# Patient Record
Sex: MALE | Race: WHITE | NOT HISPANIC OR LATINO | URBAN - NONMETROPOLITAN AREA
[De-identification: names, ages, dates, MRNs, and addresses within clinical notes are randomized per-mention and may not be internally consistent; named-entity substitution may affect disease eponyms.]

---
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
## 2025-01-06 ENCOUNTER — HOSPITAL ENCOUNTER (OUTPATIENT)
Dept: HOSPITAL 73 - LAB | Age: 20
End: 2025-01-06
Payer: COMMERCIAL

## 2025-01-06 DIAGNOSIS — Z91.89: Primary | ICD-10-CM

## 2025-01-06 LAB
ALBUMIN SERPL-MCNC: 4.7 G/DL (ref 3.5–5)
ALBUMIN/GLOB SERPL: 1.9 {RATIO} (ref 1–2.8)
ALP SERPL-CCNC: 67 U/L (ref 38–126)
ALT SERPL-CCNC: 53 IU/L (ref ?–50)
BUN SERPL-MCNC: 15 MG/DL (ref 9–20)
C TRACH DNA UR QL NAA+PROBE: NOT DETECTED
CALCIUM SERPL-MCNC: 9.7 MG/DL (ref 8.4–10.2)
CHLORIDE SERPL-SCNC: 102 MMOL/L (ref 98–107)
CHOLEST SERPL-MCNC: 183 MG/DL (ref 140–199)
CO2 SERPL-SCNC: 28 MMOL/L (ref 22–32)
ESTIMATED GLOMERULAR FILT RATE: > 60 ML/MIN (ref 60–?)
GLOBULIN SER CALC-MCNC: 2.5 G/DL (ref 1.7–4.1)
GLUCOSE SERPL-MCNC: 121 MG/DL (ref 70–100)
HDLC SERPL-MCNC: 78 MG/DL (ref 40–60)
HEMOLYSIS: < 15 (ref 0–50)
HIV 1+2 AB+HIV1 P24 AG SERPL QL IA: NEGATIVE
N GONORRHOEA RRNA UR QL NAA+PROBE: NOT DETECTED
POTASSIUM SERPL-SCNC: 4.1 MMOL/L (ref 3.4–5.1)
PROT SERPL-MCNC: 7.2 G/DL (ref 6.3–8.2)
SODIUM SERPL-SCNC: 141 MMOL/L (ref 137–145)
TRIGL SERPL-MCNC: 90 MG/DL (ref 35–150)

## 2025-01-06 PROCEDURE — 86706 HEP B SURFACE ANTIBODY: CPT

## 2025-01-06 PROCEDURE — 80053 COMPREHEN METABOLIC PANEL: CPT

## 2025-01-06 PROCEDURE — 36415 COLL VENOUS BLD VENIPUNCTURE: CPT

## 2025-01-06 PROCEDURE — 86592 SYPHILIS TEST NON-TREP QUAL: CPT

## 2025-01-06 PROCEDURE — 80061 LIPID PANEL: CPT

## 2025-01-06 PROCEDURE — 87389 HIV-1 AG W/HIV-1&-2 AB AG IA: CPT

## 2025-01-06 PROCEDURE — 87491 CHLMYD TRACH DNA AMP PROBE: CPT

## 2025-01-06 PROCEDURE — 80074 ACUTE HEPATITIS PANEL: CPT

## 2025-01-06 PROCEDURE — 87591 N.GONORRHOEAE DNA AMP PROB: CPT
